# Patient Record
Sex: MALE | Race: WHITE | NOT HISPANIC OR LATINO | URBAN - METROPOLITAN AREA
[De-identification: names, ages, dates, MRNs, and addresses within clinical notes are randomized per-mention and may not be internally consistent; named-entity substitution may affect disease eponyms.]

---

## 2024-03-04 ENCOUNTER — OFFICE VISIT (OUTPATIENT)
Dept: PRIMARY CARE | Facility: CLINIC | Age: 34
End: 2024-03-04
Payer: COMMERCIAL

## 2024-03-04 ENCOUNTER — LAB (OUTPATIENT)
Dept: LAB | Facility: LAB | Age: 34
End: 2024-03-04
Payer: COMMERCIAL

## 2024-03-04 VITALS
HEIGHT: 73 IN | DIASTOLIC BLOOD PRESSURE: 90 MMHG | WEIGHT: 290 LBS | SYSTOLIC BLOOD PRESSURE: 135 MMHG | BODY MASS INDEX: 38.43 KG/M2

## 2024-03-04 DIAGNOSIS — R53.83 FATIGUE, UNSPECIFIED TYPE: ICD-10-CM

## 2024-03-04 DIAGNOSIS — F41.9 ANXIETY: ICD-10-CM

## 2024-03-04 DIAGNOSIS — E55.9 VITAMIN D DEFICIENCY: ICD-10-CM

## 2024-03-04 DIAGNOSIS — E55.9 VITAMIN D DEFICIENCY: Primary | ICD-10-CM

## 2024-03-04 DIAGNOSIS — Z11.3 SCREEN FOR STD (SEXUALLY TRANSMITTED DISEASE): ICD-10-CM

## 2024-03-04 LAB
25(OH)D3 SERPL-MCNC: 20 NG/ML (ref 30–100)
ALBUMIN SERPL BCP-MCNC: 4.4 G/DL (ref 3.4–5)
ALP SERPL-CCNC: 35 U/L (ref 33–120)
ALT SERPL W P-5'-P-CCNC: 64 U/L (ref 10–52)
ANION GAP SERPL CALC-SCNC: 14 MMOL/L (ref 10–20)
AST SERPL W P-5'-P-CCNC: 36 U/L (ref 9–39)
BILIRUB SERPL-MCNC: 1 MG/DL (ref 0–1.2)
BUN SERPL-MCNC: 20 MG/DL (ref 6–23)
CALCIUM SERPL-MCNC: 9.6 MG/DL (ref 8.6–10.6)
CHLORIDE SERPL-SCNC: 104 MMOL/L (ref 98–107)
CHOLEST SERPL-MCNC: 193 MG/DL (ref 0–199)
CHOLESTEROL/HDL RATIO: 3.6
CO2 SERPL-SCNC: 28 MMOL/L (ref 21–32)
CREAT SERPL-MCNC: 1.13 MG/DL (ref 0.5–1.3)
EGFRCR SERPLBLD CKD-EPI 2021: 88 ML/MIN/1.73M*2
ERYTHROCYTE [DISTWIDTH] IN BLOOD BY AUTOMATED COUNT: 12.7 % (ref 11.5–14.5)
EST. AVERAGE GLUCOSE BLD GHB EST-MCNC: 94 MG/DL
GLUCOSE SERPL-MCNC: 94 MG/DL (ref 74–99)
HBA1C MFR BLD: 4.9 %
HCT VFR BLD AUTO: 44.8 % (ref 41–52)
HDLC SERPL-MCNC: 54.2 MG/DL
HGB BLD-MCNC: 16.3 G/DL (ref 13.5–17.5)
HIV 1+2 AB+HIV1 P24 AG SERPL QL IA: NONREACTIVE
LDLC SERPL CALC-MCNC: 106 MG/DL
MCH RBC QN AUTO: 30.1 PG (ref 26–34)
MCHC RBC AUTO-ENTMCNC: 36.4 G/DL (ref 32–36)
MCV RBC AUTO: 83 FL (ref 80–100)
NON HDL CHOLESTEROL: 139 MG/DL (ref 0–149)
NRBC BLD-RTO: 0 /100 WBCS (ref 0–0)
PLATELET # BLD AUTO: 247 X10*3/UL (ref 150–450)
POTASSIUM SERPL-SCNC: 4.7 MMOL/L (ref 3.5–5.3)
PROT SERPL-MCNC: 7.2 G/DL (ref 6.4–8.2)
RBC # BLD AUTO: 5.42 X10*6/UL (ref 4.5–5.9)
SODIUM SERPL-SCNC: 141 MMOL/L (ref 136–145)
TREPONEMA PALLIDUM IGG+IGM AB [PRESENCE] IN SERUM OR PLASMA BY IMMUNOASSAY: NONREACTIVE
TRIGL SERPL-MCNC: 163 MG/DL (ref 0–149)
TSH SERPL-ACNC: 3.9 MIU/L (ref 0.44–3.98)
VLDL: 33 MG/DL (ref 0–40)
WBC # BLD AUTO: 7.2 X10*3/UL (ref 4.4–11.3)

## 2024-03-04 PROCEDURE — 87661 TRICHOMONAS VAGINALIS AMPLIF: CPT

## 2024-03-04 PROCEDURE — 85027 COMPLETE CBC AUTOMATED: CPT

## 2024-03-04 PROCEDURE — 99204 OFFICE O/P NEW MOD 45 MIN: CPT | Performed by: STUDENT IN AN ORGANIZED HEALTH CARE EDUCATION/TRAINING PROGRAM

## 2024-03-04 PROCEDURE — 87389 HIV-1 AG W/HIV-1&-2 AB AG IA: CPT

## 2024-03-04 PROCEDURE — 80061 LIPID PANEL: CPT

## 2024-03-04 PROCEDURE — 86780 TREPONEMA PALLIDUM: CPT

## 2024-03-04 PROCEDURE — 80053 COMPREHEN METABOLIC PANEL: CPT

## 2024-03-04 PROCEDURE — 83036 HEMOGLOBIN GLYCOSYLATED A1C: CPT

## 2024-03-04 PROCEDURE — 1036F TOBACCO NON-USER: CPT | Performed by: STUDENT IN AN ORGANIZED HEALTH CARE EDUCATION/TRAINING PROGRAM

## 2024-03-04 PROCEDURE — 84443 ASSAY THYROID STIM HORMONE: CPT

## 2024-03-04 PROCEDURE — 87800 DETECT AGNT MULT DNA DIREC: CPT

## 2024-03-04 PROCEDURE — 82306 VITAMIN D 25 HYDROXY: CPT

## 2024-03-04 PROCEDURE — 36415 COLL VENOUS BLD VENIPUNCTURE: CPT

## 2024-03-04 RX ORDER — ESCITALOPRAM OXALATE 20 MG/1
20 TABLET ORAL DAILY
Qty: 90 TABLET | Refills: 3 | Status: SHIPPED | OUTPATIENT
Start: 2024-03-04 | End: 2025-03-04

## 2024-03-04 RX ORDER — ESCITALOPRAM OXALATE 20 MG/1
20 TABLET ORAL DAILY
COMMUNITY
End: 2024-03-04 | Stop reason: SDUPTHER

## 2024-03-04 NOTE — PROGRESS NOTES
Subjective   Patient ID: Kevin Su is a 33 y.o. male who presents for New Patient Visit (Est care ), Fatigue (Excessive for about a yr ), GI Problem (Diarrhea for about a yr ), and Referral (Psychiatry ).  \[facility]\""  Roberto is here to establish care.    He has been stable dose of lexapro for ~2 years. Mood has been much improved since starting on the lexapro. He was having frequent panic attacks prior to starting the lexapro. Previously tried on wellbutrin, which worsened his symptoms. He reports feeling fatigued. He reports worsening abdominal discomfort described as fullness in the last couple of months, having more diarrhea. Worse with dairy, acidic foods, and carb heavy.    He is sleeping ~8-10 hour nightly. He wakes up feeling excessively tired. He thinks he may snore, but isn't positive. He will occasionally wake up gasping for breath. He has been taking naps more frequently during the day.      PMHx: anxiety  SurgHx: wisdom teeth extraction  FamHx: ELSIE, HTN - father, brother  SocialHx: Never smoker. Never vaped. Drinks EtOH ~12-20 daily. Never drug use. He is working at [employer]. He is sexual active. Not currently, multiple partners historically.     Review of Systems  12-point ROS was reviewed and is negative, unless otherwise noted in HPI    Objective   Vitals:    03/04/24 1432   BP: 135/90      Physical Exam  GEN: alert, conversant, NAD  HEENT: PERRL, EOMI, MMM, Tms pearly gray bilaterally  NECK: supple, no LAD appreciated  CHEST: CTAB  CV: S1, S2, RRR, no murmurs appreciated  ABD: soft, NT, ND  EXT: no significant LE edema  SKIN: warm, dry    Assessment/Plan   #fatigue  #suspected ELSIE  - home sleep study ordered  - Check CBC, TSH, vitamin D level    #Anxiety  - Offered referral for counseling/therapy services, patient declines at this time. He was previously established  - Continue Lexapro nightly    #abdominal discomfort/bloating  #loose stools  Suspected IBS-D  Reports worsened symptoms with  dairy, fried foods, refined sugars, processed carbs  Plan:  - Discussed low FODMAP diet    Health Maintenance:  Vaccines: COVID (UTD), Flu (declines), TDAP (2021), Pneumonia (2022)  Screening: N/A  Labs: CBC, CMP, TSH, Lipid panel, Hgba1c, Vitamin D     RTC in 12 months, or sooner PRN    Keron Styles, DO

## 2024-03-05 LAB
C TRACH RRNA SPEC QL NAA+PROBE: NEGATIVE
N GONORRHOEA DNA SPEC QL PROBE+SIG AMP: NEGATIVE
T VAGINALIS RRNA SPEC QL NAA+PROBE: NEGATIVE

## 2024-05-14 DIAGNOSIS — R19.5 LOOSE STOOLS: Primary | ICD-10-CM

## 2024-05-16 ENCOUNTER — APPOINTMENT (OUTPATIENT)
Dept: SLEEP MEDICINE | Facility: CLINIC | Age: 34
End: 2024-05-16
Payer: COMMERCIAL

## 2024-06-17 ENCOUNTER — APPOINTMENT (OUTPATIENT)
Dept: ORTHOPEDIC SURGERY | Facility: CLINIC | Age: 34
End: 2024-06-17
Payer: COMMERCIAL

## 2024-06-18 ENCOUNTER — APPOINTMENT (OUTPATIENT)
Dept: ORTHOPEDIC SURGERY | Facility: CLINIC | Age: 34
End: 2024-06-18
Payer: COMMERCIAL

## 2024-06-21 ENCOUNTER — OFFICE VISIT (OUTPATIENT)
Dept: ORTHOPEDIC SURGERY | Facility: CLINIC | Age: 34
End: 2024-06-21
Payer: COMMERCIAL

## 2024-06-21 VITALS — WEIGHT: 290 LBS | HEIGHT: 73 IN | BODY MASS INDEX: 38.43 KG/M2

## 2024-06-21 DIAGNOSIS — S62.025A CLOSED NONDISPLACED FRACTURE OF MIDDLE THIRD OF SCAPHOID BONE OF LEFT WRIST, INITIAL ENCOUNTER: Primary | ICD-10-CM

## 2024-06-21 PROCEDURE — 99204 OFFICE O/P NEW MOD 45 MIN: CPT | Performed by: FAMILY MEDICINE

## 2024-06-21 PROCEDURE — 1036F TOBACCO NON-USER: CPT | Performed by: FAMILY MEDICINE

## 2024-06-21 NOTE — PROGRESS NOTES
History of Present Illness   Chief Complaint   Patient presents with    Left Wrist - Worker's Compensation     [facility]  DOI:6/14/24  SLIPPED AND FELL IN THE FREEZER AT WORK  FOOSH       The patient is 34 y.o. male  here with a complaint of left wrist injury.  This is a work-related injury which occurred 1 week ago.  Patient works as an  for [employer], slipped in the freezer landing on outstretched left hand with acute onset of pain.  He was seen in the emergency department after injury occurred, his x-rays obtained that were concerning for possible nondisplaced middle one third scaphoid fracture.  He was placed into a splint and recommended outpatient orthopedic follow-up.  He did remove the splint on his own a few days ago because of the heat, did get an over-the-counter wrist brace that he has been wearing.  He has some diffuse discomfort throughout his wrist, says pain at the base of his thumb does seem to be more bothersome.  He feels like there is some swelling, he denies any bruising now or at time of injury.  No numbness or tingling.  He has been out of work.  He says there is no light duty options for him    Past Medical History:   Diagnosis Date    Anxiety     Degenerative disc disease at L5-S1 level        Medication Documentation Review Audit       Reviewed by Radha Lewis LPN (Licensed Nurse) on 06/21/24 at 1553      Medication Order Taking? Sig Documenting Provider Last Dose Status   escitalopram (Lexapro) 20 mg tablet [id number] Yes Take 1 tablet (20 mg) by mouth once daily. Keron Styles, DO Taking Active                    No Known Allergies    Social History     Socioeconomic History    Marital status: Single     Spouse name: Not on file    Number of children: Not on file    Years of education: Not on file    Highest education level: Not on file   Occupational History    Not on file   Tobacco Use    Smoking status: Never    Smokeless tobacco: Never   Substance and  Sexual Activity    Alcohol use: Yes     Alcohol/week: 12.0 standard drinks of alcohol     Types: 12 Glasses of wine per week     Comment: MODERATELY    Drug use: Never    Sexual activity: Not on file   Other Topics Concern    Not on file   Social History Narrative    Not on file     Social Determinants of Health     Financial Resource Strain: High Risk (1/25/2021)    Received from [facility]    Overall Financial Resource Strain (CARDIA)     Difficulty of Paying Living Expenses: Hard   Food Insecurity: No Food Insecurity (1/25/2021)    Received from [facility]    Hunger Vital Sign     Worried About Running Out of Food in the Last Year: Never true     Ran Out of Food in the Last Year: Never true   Transportation Needs: No Transportation Needs (1/25/2021)    Received from [facility]    PRAPARE - Transportation     Lack of Transportation (Medical): No     Lack of Transportation (Non-Medical): No   Physical Activity: Sufficiently Active (1/25/2021)    Received from [facility]    Exercise Vital Sign     Days of Exercise per Week: 6 days     Minutes of Exercise per Session: 40 min   Stress: Stress Concern Present (1/25/2021)    Received from [facility]    [place] of Occupational Health - Occupational Stress Questionnaire     Feeling of Stress : To some extent   Social Connections: Unknown (1/25/2021)    Received from [facility]    Social Connection and Isolation Panel [NHANES]     Frequency of Communication with Friends and Family: Never     Frequency of Social Gatherings with Friends and Family: Once a week     Attends Hindu Services: Never     Active Member of Clubs or Organizations: No     Attends Club or Organization Meetings: Never     Marital Status: Patient declined   Intimate Partner Violence: Not on file   Housing Stability: Low Risk  (1/25/2021)    Received from [facility]    Housing Stability Vital Sign     Unable to Pay for Housing in the Last Year: No      Number of Places Lived in the Last Year: 2     Unstable Housing in the Last Year: No       Past Surgical History:   Procedure Laterality Date    OTHER SURGICAL HISTORY  03/03/2016    History Of Prior Surgery          Review of Systems   GENERAL: Negative  GI: Negative  MUSCULOSKELETAL: See HPI  SKIN: Negative  NEURO:  Negative     Physical Exam:    General/Constitutional: well appearing, no distress, appears stated age  HEENT: sclera clear  Respiratory: non labored breathing  Vascular: No edema, swelling or tenderness, except as noted in detailed exam.  Integumentary: No impressive skin lesions present, except as noted in detailed exam.  Neurological:  Alert and oriented   Psychological:  Normal mood and affect.  Musculoskeletal: Normal, except as noted in detailed exam and in HPI    Left hand/wrist: Normal appearance, no significant swelling, there is no ecchymosis present.  He does have some tenderness to palpation at the radiocarpal joint, he does have more focal tenderness to palpation at the anatomical snuffbox, there is no tenderness at the scaphoid tubercle.  He has relatively preserved range of motion at the wrist, there is discomfort at end range of flexion and extension.  No motor deficits present at the wrist, no significant pain with strength testing, he does have some pain but no weakness with resisted thumb extension.       Imaging: X-rays of left wrist from 6/14/2024 including scaphoid view were reviewed, there is evidence of a nondisplaced scaphoid waist fracture which does correlate with clinical exam findings today      Assessment   1. Closed nondisplaced fracture of middle third of scaphoid bone of left wrist, initial encounter              Plan: Discussed diagnosis, reviewed x-rays, treatment options with patient.  We did discuss that fracture is likely stable for nonoperative management, there is potential chance for nonunion, stated that scaphoid fractures in general take longer to heal than  typical fractures, middle one third fracture may take 8 to 12 weeks to fully heal.  Patient was placed into a thumb spica Exos fracture brace today.  Patient will plan to remain out of work for at least the next 7 weeks unless there are some light duty options available which he does not think there are.  Patient will plan to follow-up in 2 and half weeks for reassessment with repeat x-rays of the left wrist including scaphoid view.

## 2024-06-24 ENCOUNTER — HOSPITAL ENCOUNTER (OUTPATIENT)
Dept: RADIOLOGY | Facility: EXTERNAL LOCATION | Age: 34
Discharge: HOME | End: 2024-06-24
Payer: COMMERCIAL

## 2024-07-09 ENCOUNTER — HOSPITAL ENCOUNTER (OUTPATIENT)
Dept: RADIOLOGY | Facility: CLINIC | Age: 34
Discharge: HOME | End: 2024-07-09
Payer: COMMERCIAL

## 2024-07-09 ENCOUNTER — APPOINTMENT (OUTPATIENT)
Dept: ORTHOPEDIC SURGERY | Facility: CLINIC | Age: 34
End: 2024-07-09
Payer: COMMERCIAL

## 2024-07-09 DIAGNOSIS — S62.025A CLOSED NONDISPLACED FRACTURE OF MIDDLE THIRD OF SCAPHOID BONE OF LEFT WRIST, INITIAL ENCOUNTER: ICD-10-CM

## 2024-07-09 DIAGNOSIS — S62.025A CLOSED NONDISPLACED FRACTURE OF MIDDLE THIRD OF SCAPHOID BONE OF LEFT WRIST, INITIAL ENCOUNTER: Primary | ICD-10-CM

## 2024-07-09 PROCEDURE — 73110 X-RAY EXAM OF WRIST: CPT | Mod: LT

## 2024-07-09 PROCEDURE — 73110 X-RAY EXAM OF WRIST: CPT | Mod: LEFT SIDE | Performed by: RADIOLOGY

## 2024-07-09 PROCEDURE — 99213 OFFICE O/P EST LOW 20 MIN: CPT | Performed by: FAMILY MEDICINE

## 2024-07-09 NOTE — PROGRESS NOTES
History of Present Illness   Chief Complaint   Patient presents with    Left Wrist - Worker's Compensation     [facility]  DOI:6/14/24  DX S62.025A         The patient is 34 y.o. male  here for follow-up of left wrist nondisplaced scaphoid fracture.  Patient is a little less than 4 weeks out from injury.  Patient works at [employer], slipped in the freezer.  At our initial visit he was having some tenderness to palpation at the anatomical snuffbox and scaphoid tubercle, given this we did treat conservatively for nondisplaced scaphoid fracture, he has been immobilized in a Exos thumb spica brace for the past 2 and half weeks.  He does admit to improvement in symptoms overall since our initial visit, he mitts to some discomfort out of the brace today, he denies any new injuries or symptoms.  He is currently out of work, there are no light duty options for him at this time.      Past Medical History:   Diagnosis Date    Anxiety     Degenerative disc disease at L5-S1 level        Medication Documentation Review Audit       Reviewed by Ridge Graham MD (Physician) on 06/21/24 at 1650      Medication Order Taking? Sig Documenting Provider Last Dose Status   escitalopram (Lexapro) 20 mg tablet [id number] Yes Take 1 tablet (20 mg) by mouth once daily. Keron Styles, DO Taking Active                    No Known Allergies    Social History     Socioeconomic History    Marital status: Single     Spouse name: Not on file    Number of children: Not on file    Years of education: Not on file    Highest education level: Not on file   Occupational History    Not on file   Tobacco Use    Smoking status: Never    Smokeless tobacco: Never   Substance and Sexual Activity    Alcohol use: Yes     Alcohol/week: 12.0 standard drinks of alcohol     Types: 12 Glasses of wine per week     Comment: MODERATELY    Drug use: Never    Sexual activity: Not on file   Other Topics Concern    Not on file   Social History Narrative    Not on file      Social Determinants of Health     Financial Resource Strain: High Risk (1/25/2021)    Received from [facility]    Overall Financial Resource Strain (CARDIA)     Difficulty of Paying Living Expenses: Hard   Food Insecurity: No Food Insecurity (1/25/2021)    Received from [facility]    Hunger Vital Sign     Worried About Running Out of Food in the Last Year: Never true     Ran Out of Food in the Last Year: Never true   Transportation Needs: No Transportation Needs (1/25/2021)    Received from [facility]    PRAPARE - Transportation     Lack of Transportation (Medical): No     Lack of Transportation (Non-Medical): No   Physical Activity: Sufficiently Active (1/25/2021)    Received from [facility]    Exercise Vital Sign     Days of Exercise per Week: 6 days     Minutes of Exercise per Session: 40 min   Stress: Stress Concern Present (1/25/2021)    Received from [facility]    [place] of Occupational Health - Occupational Stress Questionnaire     Feeling of Stress : To some extent   Social Connections: Unknown (1/25/2021)    Received from [facility]    Social Connection and Isolation Panel [NHANES]     Frequency of Communication with Friends and Family: Never     Frequency of Social Gatherings with Friends and Family: Once a week     Attends Pentecostal Services: Never     Active Member of Clubs or Organizations: No     Attends Club or Organization Meetings: Never     Marital Status: Patient declined   Intimate Partner Violence: Not on file   Housing Stability: Low Risk  (1/25/2021)    Received from [facility]    Housing Stability Vital Sign     Unable to Pay for Housing in the Last Year: No     Number of Places Lived in the Last Year: 2     Unstable Housing in the Last Year: No       Past Surgical History:   Procedure Laterality Date    OTHER SURGICAL HISTORY  03/03/2016    History Of Prior Surgery          Review of Systems   GENERAL: Negative  GI:  Negative  MUSCULOSKELETAL: See HPI  SKIN: Negative  NEURO:  Negative     Physical Exam:    General/Constitutional: well appearing, no distress, appears stated age  HEENT: sclera clear  Respiratory: non labored breathing  Vascular: No edema, swelling or tenderness, except as noted in detailed exam.  Integumentary: No impressive skin lesions present, except as noted in detailed exam.  Neurological:  Alert and oriented   Psychological:  Normal mood and affect.  Musculoskeletal: Normal, except as noted in detailed exam and in HPI    Left hand/wrist: Normal appearance, no significant swelling, there is no ecchymosis present.   he does have residual tenderness to palpation at the anatomical snuffbox and scaphoid tubercle. He has relatively preserved range of motion at the wrist, there is discomfort at end range of flexion and extension.  No motor deficits present at the wrist, no significant pain with strength testing, he does have some pain but no weakness with resisted thumb extension.       Imaging: Repeat x-rays of left wrist including scaphoid view obtained today and independently reviewed, cortical irregularity seen on previous x-rays less visible today which may represent some early healing,       Assessment   1. Closed nondisplaced fracture of middle third of scaphoid bone of left wrist, initial encounter  XR wrist left 3+ views      4 weeks out from injury, fracture line is less visible on repeat x-rays today, he does remain tender to palpation      Plan: Patient will continue with fracture brace immobilization for an additional 3 weeks, he will remain out of work as there are no light duty work restrictions for him to do.  He will follow-up in 3 weeks for reassessment with repeat x-rays of the left wrist.

## 2024-07-25 ENCOUNTER — APPOINTMENT (OUTPATIENT)
Dept: GASTROENTEROLOGY | Facility: HOSPITAL | Age: 34
End: 2024-07-25
Payer: COMMERCIAL

## 2024-07-30 ENCOUNTER — APPOINTMENT (OUTPATIENT)
Dept: ORTHOPEDIC SURGERY | Facility: CLINIC | Age: 34
End: 2024-07-30
Payer: COMMERCIAL

## 2024-08-06 ENCOUNTER — HOSPITAL ENCOUNTER (OUTPATIENT)
Dept: RADIOLOGY | Facility: CLINIC | Age: 34
Discharge: HOME | End: 2024-08-06
Payer: COMMERCIAL

## 2024-08-06 ENCOUNTER — OFFICE VISIT (OUTPATIENT)
Dept: ORTHOPEDIC SURGERY | Facility: CLINIC | Age: 34
End: 2024-08-06
Payer: COMMERCIAL

## 2024-08-06 DIAGNOSIS — S62.025A CLOSED NONDISPLACED FRACTURE OF MIDDLE THIRD OF SCAPHOID BONE OF LEFT WRIST, INITIAL ENCOUNTER: Primary | ICD-10-CM

## 2024-08-06 DIAGNOSIS — M25.532 LEFT WRIST PAIN: ICD-10-CM

## 2024-08-06 PROCEDURE — 99213 OFFICE O/P EST LOW 20 MIN: CPT | Performed by: FAMILY MEDICINE

## 2024-08-06 PROCEDURE — 73110 X-RAY EXAM OF WRIST: CPT | Mod: LT

## 2024-08-06 NOTE — PROGRESS NOTES
History of Present Illness   Chief Complaint   Patient presents with    Left Wrist - Worker's Compensation     [facility]  DOI:6/14/24  DX S62.025A         The patient is 34 y.o. male  here for follow-up of left wrist nondisplaced scaphoid fracture.  Patient is a little less than 8 weeks out from injury.  Patient works at [employer], slipped in the freezer.  Treatment has been conservative with thumb spica Exos brace, he says pain at the base of his thumb is improving, he is having some discomfort in his fingers.  Patient has been out of work as he says there is no light duty work available for him.  He admits to some pain in his fingers which he thinks may be compensatory in nature    Past Medical History:   Diagnosis Date    Anxiety     Degenerative disc disease at L5-S1 level        Medication Documentation Review Audit       Reviewed by Ridge Graham MD (Physician) on 07/09/24 at 1556      Medication Order Taking? Sig Documenting Provider Last Dose Status   escitalopram (Lexapro) 20 mg tablet [id number] No Take 1 tablet (20 mg) by mouth once daily. Keron Styles, DO Taking Active                    No Known Allergies    Social History     Socioeconomic History    Marital status: Single     Spouse name: Not on file    Number of children: Not on file    Years of education: Not on file    Highest education level: Not on file   Occupational History    Not on file   Tobacco Use    Smoking status: Never    Smokeless tobacco: Never   Substance and Sexual Activity    Alcohol use: Yes     Alcohol/week: 12.0 standard drinks of alcohol     Types: 12 Glasses of wine per week     Comment: MODERATELY    Drug use: Never    Sexual activity: Not on file   Other Topics Concern    Not on file   Social History Narrative    Not on file     Social Determinants of Health     Financial Resource Strain: High Risk (1/25/2021)    [facility]    Overall Financial Resource Strain (CARDIA)      Difficulty of Paying Living Expenses: Hard   Food Insecurity: No Food Insecurity (1/25/2021)    Received from [facility]    Hunger Vital Sign     Worried About Running Out of Food in the Last Year: Never true     Ran Out of Food in the Last Year: Never true   Transportation Needs: No Transportation Needs (1/25/2021)    Received from [facility]    PRAPARE - Transportation     Lack of Transportation (Medical): No     Lack of Transportation (Non-Medical): No   Physical Activity: Sufficiently Active (1/25/2021)    Received from [facility]    Exercise Vital Sign     Days of Exercise per Week: 6 days     Minutes of Exercise per Session: 40 min   Stress: Stress Concern Present (1/25/2021)    Received from [facility] [place] of Occupational Health - Occupational Stress Questionnaire     Feeling of Stress : To some extent   Social Connections: Unknown (1/25/2021)    Received from [facility]    Social Connection and Isolation Panel [NHANES]     Frequency of Communication with Friends and Family: Never     Frequency of Social Gatherings with Friends and Family: Once a week     Attends Catholic Services: Never     Active Member of Clubs or Organizations: No     Attends Club or Organization Meetings: Never     Marital Status: Patient declined   Intimate Partner Violence: Not on file   Housing Stability: Low Risk  (1/25/2021)    Received from [facility]    Housing Stability Vital Sign     Unable to Pay for Housing in the Last Year: No     Number of Places Lived in the Last Year: 2     In the last 12 months, was there a time when you did not have a steady place to sleep or slept in a shelter (including now)?: No       Past Surgical History:   Procedure Laterality Date    OTHER SURGICAL HISTORY  03/03/2016    History Of Prior Surgery          Review of Systems   GENERAL:  Negative  GI: Negative  MUSCULOSKELETAL: See HPI  SKIN: Negative  NEURO:  Negative     Physical Exam:    General/Constitutional: well appearing, no distress, appears stated age  HEENT: sclera clear  Respiratory: non labored breathing  Vascular: No edema, swelling or tenderness, except as noted in detailed exam.  Integumentary: No impressive skin lesions present, except as noted in detailed exam.  Neurological:  Alert and oriented   Psychological:  Normal mood and affect.  Musculoskeletal: Normal, except as noted in detailed exam and in HPI    Left hand/wrist: Normal appearance, no significant swelling, there is no ecchymosis present.  No significant tenderness to palpation at anatomical snuffbox and scaphoid tubercle. He has relatively preserved range of motion at the wrist, there is discomfort at end range of flexion and extension.  No motor deficits present at the wrist, no significant pain with strength testing, he does have some pain but no weakness with resisted thumb extension.       Imaging: Repeat x-rays of left wrist including scaphoid view obtained today and independently reviewed, cortical irregularity seen on previous x-rays it is no longer visible,         Assessment   1. Closed nondisplaced fracture of middle third of scaphoid bone of left wrist, initial encounter            8 weeks out from injury, fracture line is not visible on repeat x-rays today, very minimal residual tenderness to palpation    Plan: Patient can discontinue thumb spica Exos brace immobilization at this time.  He can return to work without restrictions.  He will follow-up as needed.

## 2024-10-24 ENCOUNTER — APPOINTMENT (OUTPATIENT)
Dept: GASTROENTEROLOGY | Facility: CLINIC | Age: 34
End: 2024-10-24
Payer: COMMERCIAL

## 2024-12-13 ENCOUNTER — APPOINTMENT (OUTPATIENT)
Facility: CLINIC | Age: 34
End: 2024-12-13
Payer: COMMERCIAL

## 2025-03-05 ENCOUNTER — ANCILLARY PROCEDURE (OUTPATIENT)
Dept: URGENT CARE | Age: 35
End: 2025-03-05
Payer: COMMERCIAL

## 2025-03-05 ENCOUNTER — OFFICE VISIT (OUTPATIENT)
Dept: URGENT CARE | Age: 35
End: 2025-03-05
Payer: COMMERCIAL

## 2025-03-05 VITALS
DIASTOLIC BLOOD PRESSURE: 89 MMHG | RESPIRATION RATE: 18 BRPM | WEIGHT: 285 LBS | HEART RATE: 78 BPM | OXYGEN SATURATION: 97 % | BODY MASS INDEX: 37.77 KG/M2 | SYSTOLIC BLOOD PRESSURE: 145 MMHG | TEMPERATURE: 98.4 F | HEIGHT: 73 IN

## 2025-03-05 DIAGNOSIS — S69.92XA INJURY OF LEFT WRIST, INITIAL ENCOUNTER: ICD-10-CM

## 2025-03-05 DIAGNOSIS — M25.532 ACUTE WRIST PAIN, LEFT: ICD-10-CM

## 2025-03-05 DIAGNOSIS — S63.502A SPRAIN OF LEFT WRIST, INITIAL ENCOUNTER: Primary | ICD-10-CM

## 2025-03-05 PROCEDURE — 73110 X-RAY EXAM OF WRIST: CPT | Performed by: PHYSICIAN ASSISTANT

## 2025-03-05 PROCEDURE — 1036F TOBACCO NON-USER: CPT | Performed by: PHYSICIAN ASSISTANT

## 2025-03-05 PROCEDURE — 3008F BODY MASS INDEX DOCD: CPT | Performed by: PHYSICIAN ASSISTANT

## 2025-03-05 PROCEDURE — 99203 OFFICE O/P NEW LOW 30 MIN: CPT | Performed by: PHYSICIAN ASSISTANT

## 2025-03-05 NOTE — PATIENT INSTRUCTIONS
No fracture on XRAY.   Suspected Wrist sprain.   Use Tylenol and ibuprofen as needed for pain.   Elevate.   Rest wrist until next day of work.   Can use ace wrap for compression/comfort.

## 2025-03-05 NOTE — PROGRESS NOTES
"Subjective   Patient ID: Kvein Su is a 34 y.o. male. They present today with a chief complaint of Injury (Pt broke left wrist 6/2024. Pt was lifting heavy object one day ago. Felt sharp radial pain at time of lifting.).    History of Present Illness  -c/o left wrist pain after lifting 10 pound crate at work yesterday  -he felt a pop and has pain with ROM  -he broke his left wrist after fall back in June 2024  -he denies other injuries at this time           Past Medical History  Allergies as of 03/05/2025    (No Known Allergies)       (Not in a hospital admission)       Past Medical History:   Diagnosis Date    Anxiety     Degenerative disc disease at L5-S1 level        Past Surgical History:   Procedure Laterality Date    OTHER SURGICAL HISTORY  03/03/2016    History Of Prior Surgery       Objective    Vitals:    03/05/25 1628   BP: 145/89   BP Location: Right arm   Patient Position: Sitting   BP Cuff Size: Large adult   Pulse: 78   Resp: 18   Temp: 36.9 °C (98.4 °F)   TempSrc: Oral   SpO2: 97%   Weight: 129 kg (285 lb)   Height: 1.854 m (6' 1\")     No LMP for male patient.    Physical Exam  Vitals reviewed.   Constitutional:       General: He is not in acute distress.     Appearance: Normal appearance. He is not ill-appearing.   Musculoskeletal:      Left wrist: Tenderness present. No swelling, deformity, effusion or snuff box tenderness. Normal range of motion. Normal pulse.      Comments: -strength and ROM are intact  -he reports some pain with ROM    Neurological:      Mental Status: He is alert.       /89 (BP Location: Right arm, Patient Position: Sitting, BP Cuff Size: Large adult)   Pulse 78   Temp 36.9 °C (98.4 °F) (Oral)   Resp 18   Ht 1.854 m (6' 1\")   Wt 129 kg (285 lb)   SpO2 97%   BMI 37.60 kg/m²     Procedures    Point of Care Test & Imaging Results from this visit  No results found for this visit on 03/05/25.   XR wrist left 3+ views    Result Date: 3/5/2025  Interpreted By:  " Eleanor Hernandez, STUDY: XR WRIST LEFT 3+ VIEWS; ;  3/5/2025 4:52 pm   INDICATION: Signs/Symptoms:hx fx 6/2024, lifted 10 pound box yesterday and felt pop, pain with ROM.   ,S69.92XA Unspecified injury of left wrist, hand and finger(s), initial encounter   COMPARISON: Left wrist x-rays 08/06/2024.   ACCESSION NUMBER(S): [id number]   ORDERING CLINICIAN: TRACIE THOMAS   FINDINGS: Four views of the wrist are obtained.   No acute fracture or dislocation. Bones are well mineralized.  Soft tissues are unremarkable.       No acute fracture.     MACRO: None   Signed by: Eleanor Hernandez 3/5/2025 4:58 PM Dictation workstation:   [id number]     Diagnostic study results (if any) were reviewed by Tracie Thomas PA-C.    Assessment/Plan   Allergies, medications, history, and pertinent labs/EKGs/Imaging reviewed by Tracie Thomas PA-C.     Medical Decision Making  Xray of left wrist does not show any acute fractures.  He has no snuff box tenderness, and he has normal ROM and strength.  His next day of work is Saturday. He was advised RICE. He can use ace wrap for comfort/support. He was advised to use PRN NSAIDS and Tylenol.  He can return to work without restrictions at this time.      See scanned document for full details.     Orders and Diagnoses  Diagnoses and all orders for this visit:  Sprain of left wrist, initial encounter  Injury of left wrist, initial encounter  -     XR wrist left 3+ views; Future  Acute wrist pain, left      Medical Admin Record      Patient disposition: Home    Electronically signed by Tracie Thomas PA-C  8:56 PM

## 2025-03-11 ENCOUNTER — APPOINTMENT (OUTPATIENT)
Dept: ORTHOPEDIC SURGERY | Facility: CLINIC | Age: 35
End: 2025-03-11
Payer: COMMERCIAL

## 2025-03-11 VITALS — HEIGHT: 73 IN | WEIGHT: 285 LBS | BODY MASS INDEX: 37.77 KG/M2

## 2025-03-11 DIAGNOSIS — S63.502A LEFT WRIST SPRAIN, INITIAL ENCOUNTER: Primary | ICD-10-CM

## 2025-03-11 NOTE — LETTER
March 11, 2025     Patient: Kevin Su   YOB: 1990   Date of Visit: 3/11/2025       To Whom It May Concern:    Kevin was seen for his left wrist injury. It is my medical opinion that Kevin Su should remain out of work and may return to work on 3/25/2025 with no restrictions .    If you have any questions or concerns, please don't hesitate to call.         Sincerely,        Ridge Graham MD

## 2025-03-11 NOTE — PROGRESS NOTES
History of Present Illness   Chief Complaint   Patient presents with    Left Wrist - Worker's Compensation, Injury     Felt pain while lifting a crate of ice cream while at work  3/4/25  +pain -swelling -n/t        The patient is 34 y.o. male  here with a complaint of left wrist pain.  Acute onset of symptoms approximately 1 week ago on 3/4/2025.  This is a work-related injury.  Patient works at [employer], works as an , was lifting a crate of ice cream when he felt a sharp pain in his wrist.  He was seen in urgent care following injury, he had x-rays obtained that were negative for fracture, recommended conservative management with Ace wrap, over-the-counter medications as needed.      Of note patient was seen by me last year for left wrist injury, was diagnosed with nondisplaced scaphoid fracture that healed with conservative treatment.  He says that he has had some mild discomfort in his wrist following that injury, unsure if related to fracture, this pain is more significant than his mild discomfort, said he would notice pain with push-ups, modified how he did push-ups and seem to help with his pain.      Past Medical History:   Diagnosis Date    Anxiety     Degenerative disc disease at L5-S1 level        Medication Documentation Review Audit       Reviewed by Maria E Ibarra MA (Medical Assistant) on 25 at 1631      Medication Order Taking? Sig Documenting Provider Last Dose Status   escitalopram (Lexapro) 20 mg tablet [id number] No Take 1 tablet (20 mg) by mouth once daily. Keron Styles,  Taking  25 3141                    No Known Allergies    Social History     Socioeconomic History    Marital status: Single     Spouse name: Not on file    Number of children: Not on file    Years of education: Not on file    Highest education level: Not on file   Occupational History    Not on file   Tobacco Use    Smoking status: Never    Smokeless tobacco: Never    Substance and Sexual Activity    Alcohol use: Yes     Alcohol/week: 12.0 standard drinks of alcohol     Types: 12 Glasses of wine per week     Comment: MODERATELY    Drug use: Never    Sexual activity: Not on file   Other Topics Concern    Not on file   Social History Narrative    Not on file     Social Drivers of Health     Financial Resource Strain: High Risk (1/25/2021)    Received from [facility]    Overall Financial Resource Strain (CARDIA)     Difficulty of Paying Living Expenses: Hard   Food Insecurity: No Food Insecurity (1/25/2021)    Received from [facility]    Hunger Vital Sign     Worried About Running Out of Food in the Last Year: Never true     Ran Out of Food in the Last Year: Never true   Transportation Needs: No Transportation Needs (1/25/2021)    Received from [facility]    PRAPARE - Transportation     Lack of Transportation (Medical): No     Lack of Transportation (Non-Medical): No   Physical Activity: Sufficiently Active (1/25/2021)    Received from [facility]    Exercise Vital Sign     Days of Exercise per Week: 6 days     Minutes of Exercise per Session: 40 min   Stress: Stress Concern Present (1/25/2021)    Received from [facility]    [employer] [place] [facility] [employer] - Occupational Stress Questionnaire     Feeling of Stress : To some extent   Social Connections: Unknown (1/25/2021)    Received from [facility]    Social Connection and Isolation Panel [NHANES]     Frequency of Communication with Friends and Family: Never     Frequency of Social Gatherings with Friends and Family: Once a week     Attends Druze Services: Never     Active Member of Clubs or Organizations: No     Attends Club or Organization Meetings: Never     Marital Status: Patient declined   Intimate Partner Violence: Not on file   Housing Stability: Low Risk  (1/25/2021)     [facility]    Housing Stability Vital Sign     Unable to Pay for Housing in the Last Year: No     Number of Places Lived in the Last Year: 2     Unstable Housing in the Last Year: No       Past Surgical History:   Procedure Laterality Date    OTHER SURGICAL HISTORY  03/03/2016    History Of Prior Surgery          Review of Systems   GENERAL: Negative  GI: Negative  MUSCULOSKELETAL: See HPI  SKIN: Negative  NEURO:  Negative     Physical Exam:    General/Constitutional: well appearing, no distress, appears stated age  HEENT: sclera clear  Respiratory: non labored breathing  Vascular: No edema, swelling or tenderness, except as noted in detailed exam.  Integumentary: No impressive skin lesions present, except as noted in detailed exam.  Neurological:  Alert and oriented   Psychological:  Normal mood and affect.  Musculoskeletal: Normal, except as noted in detailed exam and in HPI      Left wrist: Normal appearance, no swelling, no skin changes.  He is focally tender at the first dorsal compartment at the radial styloid, nontender at the anatomical snuffbox or scaphoid tubercle, nontender at the radiocarpal joint.  He has good range of motion at the wrist with flexion and extension with minimal discomfort, there is some pain with ulnar deviation.  Positive Finkelstein test.  No pain or weakness with straight testing at the wrist.  No motor or sensory deficits at the median, ulnar, radial nerve distributions.  2+ radial pulse.         Imaging: X-rays of left wrist from 3/5/2025 independently reviewed, no acute abnormalities are seen, no degenerative changes are present.      Assessment   1. Left wrist sprain, initial encounter              Plan: Discussed diagnosis, further workup and treatment.  May have some degree of traumatic tendinitis of the first dorsal compartment.  Recommended conservative management.  He was fitted for a thumb spica wrist brace.  He will continue with  over-the-counter medications as needed, can try some topical Voltaren gel.  Will plan to keep him out of work for the next 2 weeks, he will follow back up at that time for reassessment, says there are no light duty work options for him at this time.  All questions and concerns were answered.

## 2025-03-19 DIAGNOSIS — F41.9 ANXIETY: ICD-10-CM

## 2025-03-19 RX ORDER — ESCITALOPRAM OXALATE 20 MG/1
20 TABLET ORAL DAILY
Qty: 90 TABLET | Refills: 3 | Status: SHIPPED | OUTPATIENT
Start: 2025-03-19 | End: 2026-03-19

## 2025-03-19 NOTE — TELEPHONE ENCOUNTER
Patient called in for a refill, he has an appt 3/26/25 and was told he needed an appt before his RX could be filled. If you can send a temp rx until then per pt.

## 2025-03-25 ENCOUNTER — APPOINTMENT (OUTPATIENT)
Dept: ORTHOPEDIC SURGERY | Facility: CLINIC | Age: 35
End: 2025-03-25
Payer: OTHER MISCELLANEOUS

## 2025-03-25 DIAGNOSIS — S63.502A LEFT WRIST SPRAIN, INITIAL ENCOUNTER: Primary | ICD-10-CM

## 2025-03-25 NOTE — PROGRESS NOTES
History of Present Illness   Chief Complaint   Patient presents with    Left Wrist - Follow-up, Worker's Compensation     DOI 3/4/25  DX S63.502A        The patient is 34 y.o. right-hand-dominant male  here for follow-up of left wrist pain/injury.  Work-related injury that occurred on 3/4/2025, works at [employer] as an , felt discomfort lifting a crate of ice cream, x-rays from urgent care were unremarkable, symptoms consistent with wrist sprain.  History of remote scaphoid fracture on the same wrist that healed with conservative treatment the patient said he had some mild lingering discomfort after this injury which is also work-related.  At our initial visit I recommended conservative management with thumb spica wrist brace, over-the-counter medications.  He does admit to improvement in symptoms overall, there is still some mild lingering discomfort in the wrist, he points to the radial styloid as area of discomfort, no pain at the scaphoid region.  He is scheduled to return back to work tomorrow which he thinks he will be able to do.        Past Medical History:   Diagnosis Date    Anxiety     Degenerative disc disease at L5-S1 level        Medication Documentation Review Audit       Reviewed by Ridge Graham MD (Physician) on 03/11/25 at 1609      Medication Order Taking? Sig Documenting Provider Last Dose Status   escitalopram (Lexapro) 20 mg tablet [id number] No Take 1 tablet (20 mg) by mouth once daily. Keron Styles, DO Taking Active                    No Known Allergies    Social History     Socioeconomic History    Marital status: Single     Spouse name: Not on file    Number of children: Not on file    Years of education: Not on file    Highest education level: Not on file   Occupational History    Not on file   Tobacco Use    Smoking status: Never    Smokeless tobacco: Never   Substance and Sexual Activity    Alcohol use: Yes     Alcohol/week: 12.0 standard drinks of alcohol      Types: 12 Glasses of wine per week     Comment: MODERATELY    Drug use: Never    Sexual activity: Not on file   Other Topics Concern    Not on file   Social History Narrative    Not on file     Social Drivers of Health     Financial Resource Strain: High Risk (1/25/2021)    Received from [facility]    Overall Financial Resource Strain (CARDIA)     Difficulty of Paying Living Expenses: Hard   Food Insecurity: No Food Insecurity (1/25/2021)    Received from [facility]    Hunger Vital Sign     Worried About Running Out of Food in the Last Year: Never true     Ran Out of Food in the Last Year: Never true   Transportation Needs: No Transportation Needs (1/25/2021)    Received from [facility]    PRAPARE - Transportation     Lack of Transportation (Medical): No     Lack of Transportation (Non-Medical): No   Physical Activity: Sufficiently Active (1/25/2021)    Received from [facility]    Exercise Vital Sign     Days of Exercise per Week: 6 days     Minutes of Exercise per Session: 40 min   Stress: Stress Concern Present (1/25/2021)    Received from [facility] - Occupational Stress Questionnaire     Feeling of Stress : To some extent   Social Connections: Unknown (1/25/2021)    Received from [facility]    Social Connection and Isolation Panel [NHANES]     Frequency of Communication with Friends and Family: Never     Frequency of Social Gatherings with Friends and Family: Once a week     Attends Confucianism Services: Never     Active Member of Clubs or Organizations: No     Attends Club or Organization Meetings: Never     Marital Status: Patient declined   Intimate Partner Violence: Not on file   Housing Stability: Low Risk  (1/25/2021)    Received from [facility]    Housing Stability Vital Sign     Unable to Pay  for Housing in the Last Year: No     Number of Places Lived in the Last Year: 2     Unstable Housing in the Last Year: No       Past Surgical History:   Procedure Laterality Date    OTHER SURGICAL HISTORY  03/03/2016    History Of Prior Surgery          Review of Systems   GENERAL: Negative  GI: Negative  MUSCULOSKELETAL: See HPI  SKIN: Negative  NEURO:  Negative     Physical Exam:    General/Constitutional: well appearing, no distress, appears stated age  HEENT: sclera clear  Respiratory: non labored breathing  Vascular: No edema, swelling or tenderness, except as noted in detailed exam.  Integumentary: No impressive skin lesions present, except as noted in detailed exam.  Neurological:  Alert and oriented   Psychological:  Normal mood and affect.  Musculoskeletal: Normal, except as noted in detailed exam and in HPI      Left wrist: Normal appearance, no swelling, no skin changes.  There is residual tenderness at the first dorsal compartment at the radial styloid, nontender at the anatomical snuffbox or scaphoid tubercle, nontender at the radiocarpal joint.  He has good range of motion at the wrist with flexion and extension with minimal discomfort, there is some pain with ulnar deviation.  Positive Finkelstein test.  No pain or weakness with straight testing at the wrist.  No motor or sensory deficits at the median, ulnar, radial nerve distributions.  2+ radial pulse.         Imaging: No new imaging today      Assessment   1. Left wrist sprain, initial encounter              Plan: Improving symptoms over the past 2 weeks with conservative treatment of there is still residual discomfort, tenderness at the first dorsal compartment, likely has some degree of some traumatic tenderness here.  Patient does feel like he should be able to return to his work duties at this time.  We discussed alternative treatments, he may benefit from injection in the first dorsal compartment with corticosteroid, we will submit C9 request  for this.  He will follow-up if approved for injection.  He will use Voltaren gel in the interim

## 2025-03-25 NOTE — LETTER
March 25, 2025     Patient: Kevin Su   YOB: 1990   Date of Visit: 3/25/2025       To Whom It May Concern:    Kevin was seen in follow up for his left wrist. It is my medical opinion that Kevin  may return to full duty immediately with no restrictions.    If you have any questions or concerns, please don't hesitate to call.         Sincerely,        Ridge Graham MD

## 2025-03-26 ENCOUNTER — APPOINTMENT (OUTPATIENT)
Dept: PRIMARY CARE | Facility: CLINIC | Age: 35
End: 2025-03-26
Payer: COMMERCIAL

## 2025-04-07 ENCOUNTER — APPOINTMENT (OUTPATIENT)
Dept: PRIMARY CARE | Facility: CLINIC | Age: 35
End: 2025-04-07
Payer: COMMERCIAL

## 2025-04-07 PROCEDURE — NOSHO NO SHOW VISIT: Performed by: STUDENT IN AN ORGANIZED HEALTH CARE EDUCATION/TRAINING PROGRAM

## 2025-04-17 ENCOUNTER — TELEPHONE (OUTPATIENT)
Dept: ORTHOPEDIC SURGERY | Facility: CLINIC | Age: 35
End: 2025-04-17
Payer: COMMERCIAL

## 2025-04-18 ENCOUNTER — APPOINTMENT (OUTPATIENT)
Dept: PRIMARY CARE | Facility: CLINIC | Age: 35
End: 2025-04-18
Payer: COMMERCIAL

## 2025-05-07 ENCOUNTER — APPOINTMENT (OUTPATIENT)
Dept: PRIMARY CARE | Facility: CLINIC | Age: 35
End: 2025-05-07
Payer: COMMERCIAL

## 2025-05-07 PROCEDURE — NOSHO NO SHOW VISIT: Performed by: STUDENT IN AN ORGANIZED HEALTH CARE EDUCATION/TRAINING PROGRAM

## 2025-05-21 ENCOUNTER — APPOINTMENT (OUTPATIENT)
Dept: PRIMARY CARE | Facility: CLINIC | Age: 35
End: 2025-05-21
Payer: COMMERCIAL

## 2025-06-09 ENCOUNTER — APPOINTMENT (OUTPATIENT)
Dept: PRIMARY CARE | Facility: CLINIC | Age: 35
End: 2025-06-09
Payer: COMMERCIAL

## 2025-06-09 VITALS — DIASTOLIC BLOOD PRESSURE: 81 MMHG | SYSTOLIC BLOOD PRESSURE: 121 MMHG

## 2025-06-09 DIAGNOSIS — R06.83 SNORING: ICD-10-CM

## 2025-06-09 DIAGNOSIS — K52.9 CHRONIC DIARRHEA: ICD-10-CM

## 2025-06-09 DIAGNOSIS — R35.89 POLYURIA: Primary | ICD-10-CM

## 2025-06-09 DIAGNOSIS — E66.812 OBESITY, CLASS II, BMI 35-39.9: ICD-10-CM

## 2025-06-09 DIAGNOSIS — Z00.00 WELL ADULT EXAM: ICD-10-CM

## 2025-06-09 PROCEDURE — 99395 PREV VISIT EST AGE 18-39: CPT | Performed by: STUDENT IN AN ORGANIZED HEALTH CARE EDUCATION/TRAINING PROGRAM

## 2025-06-09 PROCEDURE — 1036F TOBACCO NON-USER: CPT | Performed by: STUDENT IN AN ORGANIZED HEALTH CARE EDUCATION/TRAINING PROGRAM

## 2025-06-09 NOTE — PROGRESS NOTES
Subjective   Patient ID: Kevin Su is a 35 y.o. male who presents for Follow-up, DISCUSS MEDICATIONS, and DISCUSS DIABETES.  HPI  Roberto is here for medication/physical/ follow up visit.    He has noted increase urinary frequency, increased in the last 2 months. Feeling increase thirst. He has been drinking water throughout the day. Also drinking milk, juice, beer. He was drinking ~5-6 beers ~5 days per week. He has been reducing to 2 days.     Persistent daily fatigue. Does not wake up feeling well rested. Snores, does wake up gasping for breath. Does report having a sleep study >5 years ago, no sleep apnea at that time, he has gained significant weight since that time. He reports that he could fall asleep at any time throughout the day.    He has been trying to implement healthy dietary changes. Does have daily loose stool. Non-bloody. ~2-3 episodes daily. Chronic. He is interested in follow up with GI.    Review of Systems  12-point ROS was reviewed and is negative, unless otherwise noted in HPI    Objective   Vitals:    06/09/25 1401   BP: 121/81      Physical Exam  GEN: alert, conversant, NAD  HEENT: PERRL, EOMI, MMM, Tms pearly gray bilaterally  NECK: supple, no LAD appreciated  CHEST: CTAB  CV: S1, S2, RRR, no murmurs appreciated  ABD: soft, NT, ND, obese  EXT: no significant LE edema  SKIN: warm, dry    Assessment/Plan   #well adult  - Counseled continued efforts on healthy lifestyle modification including balanced diet, and continued exercise for >5 minutes  - counseled age appropriate vaccines and preventative measures    #fatigue  #suspected ELSIE  - home sleep study ordered today  - obtain labs    #Obesity, Class II  #polyuria r/o DM  - Counseled continued efforts on lifestyle modification including weight loss, diet, and increased exercise for >5 minutes  - Obtain labs today    #Anxiety  - Offered referral for counseling/therapy services, patient declines at this time. He was previously  established  - Continue Lexapro nightly    #IBS-D  - diet low fodmap diet  - increase soluble fiber  - referral to GI    Health Maintenance:  Vaccines: COVID (UTD), Flu (declines), TDAP (2021), Pneumonia (2022)  Screening: N/A  Labs: Obtain today     RTC in 12 months, or sooner PRN    Keron Styles, DO

## 2025-06-10 LAB
ALBUMIN SERPL-MCNC: 4.6 G/DL (ref 3.6–5.1)
ALBUMIN/CREAT UR: 1 MG/G CREAT
ALP SERPL-CCNC: 36 U/L (ref 36–130)
ALT SERPL-CCNC: 46 U/L (ref 9–46)
ANION GAP SERPL CALCULATED.4IONS-SCNC: 8 MMOL/L (CALC) (ref 7–17)
APPEARANCE UR: CLEAR
AST SERPL-CCNC: 25 U/L (ref 10–40)
BACTERIA #/AREA URNS HPF: ABNORMAL /HPF
BILIRUB SERPL-MCNC: 1.4 MG/DL (ref 0.2–1.2)
BILIRUB UR QL STRIP: NEGATIVE
BUN SERPL-MCNC: 21 MG/DL (ref 7–25)
CALCIUM SERPL-MCNC: 9.4 MG/DL (ref 8.6–10.3)
CHLORIDE SERPL-SCNC: 105 MMOL/L (ref 98–110)
CHOLEST SERPL-MCNC: 247 MG/DL
CHOLEST/HDLC SERPL: 5.1 (CALC)
CO2 SERPL-SCNC: 25 MMOL/L (ref 20–32)
COLOR UR: YELLOW
CREAT SERPL-MCNC: 0.97 MG/DL (ref 0.6–1.26)
CREAT UR-MCNC: 169 MG/DL (ref 20–320)
EGFRCR SERPLBLD CKD-EPI 2021: 104 ML/MIN/1.73M2
ERYTHROCYTE [DISTWIDTH] IN BLOOD BY AUTOMATED COUNT: 13.7 % (ref 11–15)
EST. AVERAGE GLUCOSE BLD GHB EST-MCNC: 105 MG/DL
EST. AVERAGE GLUCOSE BLD GHB EST-SCNC: 5.8 MMOL/L
GLUCOSE SERPL-MCNC: 94 MG/DL (ref 65–99)
GLUCOSE UR QL STRIP: NEGATIVE
HBA1C MFR BLD: 5.3 %
HCT VFR BLD AUTO: 51.2 % (ref 38.5–50)
HDLC SERPL-MCNC: 48 MG/DL
HGB BLD-MCNC: 17 G/DL (ref 13.2–17.1)
HGB UR QL STRIP: NEGATIVE
HYALINE CASTS #/AREA URNS LPF: ABNORMAL /LPF
KETONES UR QL STRIP: NEGATIVE
LDLC SERPL CALC-MCNC: 148 MG/DL (CALC)
LEUKOCYTE ESTERASE UR QL STRIP: ABNORMAL
MCH RBC QN AUTO: 29.1 PG (ref 27–33)
MCHC RBC AUTO-ENTMCNC: 33.2 G/DL (ref 32–36)
MCV RBC AUTO: 87.5 FL (ref 80–100)
MICROALBUMIN UR-MCNC: 0.2 MG/DL
NITRITE UR QL STRIP: NEGATIVE
NONHDLC SERPL-MCNC: 199 MG/DL (CALC)
PH UR STRIP: 5.5 [PH] (ref 5–8)
PLATELET # BLD AUTO: 213 THOUSAND/UL (ref 140–400)
PMV BLD REES-ECKER: 11.8 FL (ref 7.5–12.5)
POTASSIUM SERPL-SCNC: 4.6 MMOL/L (ref 3.5–5.3)
PROT SERPL-MCNC: 7.4 G/DL (ref 6.1–8.1)
PROT UR QL STRIP: NEGATIVE
RBC # BLD AUTO: 5.85 MILLION/UL (ref 4.2–5.8)
RBC #/AREA URNS HPF: ABNORMAL /HPF
SERVICE CMNT-IMP: ABNORMAL
SODIUM SERPL-SCNC: 138 MMOL/L (ref 135–146)
SP GR UR STRIP: 1.02 (ref 1–1.03)
SQUAMOUS #/AREA URNS HPF: ABNORMAL /HPF
TRIGL SERPL-MCNC: 331 MG/DL
TSH SERPL-ACNC: 1.43 MIU/L (ref 0.4–4.5)
WBC # BLD AUTO: 6.5 THOUSAND/UL (ref 3.8–10.8)
WBC #/AREA URNS HPF: ABNORMAL /HPF

## 2025-08-26 ENCOUNTER — APPOINTMENT (OUTPATIENT)
Dept: GASTROENTEROLOGY | Facility: HOSPITAL | Age: 35
End: 2025-08-26
Payer: COMMERCIAL

## 2025-12-02 ENCOUNTER — APPOINTMENT (OUTPATIENT)
Dept: GASTROENTEROLOGY | Facility: HOSPITAL | Age: 35
End: 2025-12-02
Payer: COMMERCIAL